# Patient Record
Sex: FEMALE | Race: WHITE | Employment: FULL TIME | URBAN - METROPOLITAN AREA
[De-identification: names, ages, dates, MRNs, and addresses within clinical notes are randomized per-mention and may not be internally consistent; named-entity substitution may affect disease eponyms.]

---

## 2018-02-15 ENCOUNTER — HOSPITAL ENCOUNTER (OUTPATIENT)
Dept: GENERAL RADIOLOGY | Age: 33
Discharge: HOME | End: 2018-02-17

## 2018-02-15 ENCOUNTER — HOSPITAL ENCOUNTER (OUTPATIENT)
Age: 33
Discharge: HOME | End: 2018-02-15

## 2018-02-15 DIAGNOSIS — Z00.00 PHYSICAL EXAM: ICD-10-CM

## 2018-02-15 PROCEDURE — 71046 X-RAY EXAM CHEST 2 VIEWS: CPT

## 2019-02-20 ENCOUNTER — HOSPITAL ENCOUNTER (OUTPATIENT)
Age: 34
Discharge: HOME | End: 2019-02-20

## 2019-02-20 ENCOUNTER — HOSPITAL ENCOUNTER (OUTPATIENT)
Dept: GENERAL RADIOLOGY | Age: 34
Discharge: HOME | End: 2019-02-22

## 2019-02-20 DIAGNOSIS — T14.90XA INJURY: ICD-10-CM

## 2019-02-20 PROCEDURE — 71046 X-RAY EXAM CHEST 2 VIEWS: CPT

## 2023-07-03 ENCOUNTER — HOSPITAL ENCOUNTER (EMERGENCY)
Age: 38
Discharge: HOME | End: 2023-07-03
Attending: EMERGENCY MEDICINE
Payer: COMMERCIAL

## 2023-07-03 VITALS
OXYGEN SATURATION: 100 % | HEART RATE: 91 BPM | SYSTOLIC BLOOD PRESSURE: 160 MMHG | RESPIRATION RATE: 17 BRPM | WEIGHT: 169.53 LBS | TEMPERATURE: 97.4 F | DIASTOLIC BLOOD PRESSURE: 109 MMHG

## 2023-07-03 DIAGNOSIS — S16.1XXA ACUTE STRAIN OF NECK MUSCLE, INITIAL ENCOUNTER: Primary | ICD-10-CM

## 2023-07-03 PROCEDURE — 99283 EMERGENCY DEPT VISIT LOW MDM: CPT

## 2023-07-03 PROCEDURE — 6370000000 HC RX 637 (ALT 250 FOR IP)

## 2023-07-03 RX ORDER — LIDOCAINE 4 G/G
1 PATCH TOPICAL DAILY
Qty: 10 EACH | Refills: 0 | Status: SHIPPED | OUTPATIENT
Start: 2023-07-03 | End: 2023-07-13

## 2023-07-03 RX ORDER — IBUPROFEN 200 MG
400 TABLET ORAL EVERY 8 HOURS PRN
Qty: 30 TABLET | Refills: 0 | Status: SHIPPED | OUTPATIENT
Start: 2023-07-03 | End: 2023-07-08

## 2023-07-03 RX ORDER — IBUPROFEN 400 MG/1
600 TABLET ORAL ONCE
Status: COMPLETED | OUTPATIENT
Start: 2023-07-03 | End: 2023-07-03

## 2023-07-03 RX ORDER — LIDOCAINE 4 G/G
1 PATCH TOPICAL DAILY
Status: DISCONTINUED | OUTPATIENT
Start: 2023-07-03 | End: 2023-07-03 | Stop reason: HOSPADM

## 2023-07-03 RX ADMIN — IBUPROFEN 600 MG: 400 TABLET, FILM COATED ORAL at 18:09

## 2023-07-03 ASSESSMENT — PAIN - FUNCTIONAL ASSESSMENT: PAIN_FUNCTIONAL_ASSESSMENT: 0-10

## 2023-07-03 ASSESSMENT — PAIN SCALES - GENERAL: PAINLEVEL_OUTOF10: 6

## 2023-07-03 NOTE — ED NOTES
Pt resting in bed completing workmans comp paperwork no verbalized needs at this time.       Liz Hammond, DEREK  07/03/23 2741

## 2023-07-03 NOTE — DISCHARGE INSTRUCTIONS
You were seen for evaluation of neck pain after a fall. This is likely due to a muscle spasm and strain. You will be discharged home with ibuprofen that he can take as needed for pain. You also be given lidocaine patches that you can apply to the area once per day. You should follow-up outpatient with your primary care doctor in the next 3 days for reevaluation of symptoms you should return the emergency department immediately for any worsening pain, numbness, tingling, dizziness, vision changes, loss of consciousness, other new or concerning symptoms.

## 2023-07-03 NOTE — ED PROVIDER NOTES
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 female with history of ADHD who presents with fall off of the fire truck that occurred 2 days ago. Patient works as a  and she was working at an event 2 days ago when she was standing on the back and she lost balance and fell about 3 feet off the back of the stationary fire truck. Patient states she landed on her right hip first and then fell to her side and hit her right side of her head. Patient did not lose consciousness. She is not anticoagulated. Patient states she was able to ambulate directly after the incident. She does not have associated nausea or vomiting. Patient is taking ibuprofen at home with some relief of pain. She now complains of pain in the right side of her neck that is worse with movement. No numbness or weakness. No dizziness, headaches, nausea or vomiting, chest pain, shortness of breath    PAST MEDICAL / SURGICAL / SOCIAL / FAMILY HISTORY      has a past medical history of ADHD (attention deficit hyperactivity disorder). has no past surgical history on file.       Social History     Socioeconomic History    Marital status: Single     Spouse name: Not on file    Number of children: Not on file    Years of education: Not on file    Highest education level: Not on file   Occupational History    Not on file   Tobacco Use    Smoking status: Never    Smokeless tobacco: Never   Substance and Sexual Activity

## 2023-07-03 NOTE — ED NOTES
Pt arrives to ED with reports of a fall. 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 Pt reports hitting head but denies a LOC with increase stiffening through the day, with troubles with sleep. Pt reports tasking ibuprofen for pain management, but still continues to experience pain, describing as stiff, aching, and sharper with turning head, rating pain 6/10.      Pt is hypertensive, otherwise VSS, NAD, RR equal and non labored, pt is alert and oriented x4,     Call light within reach    Following plan of care       Jessica Lomax RN  07/03/23 3648

## 2023-07-04 ASSESSMENT — ENCOUNTER SYMPTOMS: BACK PAIN: 0

## 2023-08-08 ENCOUNTER — HOSPITAL ENCOUNTER (OUTPATIENT)
Dept: PHYSICAL THERAPY | Age: 38
Setting detail: THERAPIES SERIES
Discharge: HOME | End: 2023-08-08
Payer: COMMERCIAL

## 2023-08-08 PROCEDURE — 97110 THERAPEUTIC EXERCISES: CPT

## 2023-08-08 PROCEDURE — 97161 PT EVAL LOW COMPLEX 20 MIN: CPT

## 2023-08-08 NOTE — CONSULTS
daily - 2 x weekly - 3 sets - 10 reps  - Shoulder Overhead Press in Abduction with Dumbbells  - 1 x daily - 2 x weekly - 3 sets - 10 reps    Pt. Education:  [x] Plans/Goals, Risks/Benefits discussed  [x] Home exercise program:   Method of Education: [x] Verbal  [x] Demo  [x] Written  Comprehension of Education:  [x] Verbalizes understanding. [x] Demonstrates understanding. [x] Needs Review. [] Demonstrates/verbalizes understanding of HEP/Ed previously given. Specific Instructions for next treatment: Cervical and shoulder strengthening, scapular training, postural education. Evaluation Complexity:  History (Personal factors, comorbidities) [] 0 [x] 1-2 [] 3+   Exam (limitations, restrictions) [] 1-2 [x] 3 [] 4+   Clinical presentation (progression) [x] Stable [] Evolving  [] Unstable   Decision Making [x] Low [] Moderate [] High    [x] Low Complexity [] Moderate Complexity [] High Complexity       Treatment Charges: Mins Time In/Out Units   [x] Evaluation       [x]  Low       []  Moderate       []  High 25 1278-1515 1   []  Modalities      [x]  Ther Exercise 15 5200-6056 1   []  Manual Therapy      []  Ther Activities      []  Aquatics      []  Vasocompression      []  Other        TOTAL TREATMENT TIME: 40      Time in:0730     Time out:0910    Electronically signed by: Eric Ambriz PT        Physician Signature:________________________________Date:__________________  By signing above or cosigning this note, I have reviewed this plan of care and certify a need for medically necessary rehabilitation services.      *PLEASE SIGN ABOVE AND FAX BACK ALL PAGES*

## 2023-08-10 ENCOUNTER — HOSPITAL ENCOUNTER (OUTPATIENT)
Dept: PHYSICAL THERAPY | Age: 38
Setting detail: THERAPIES SERIES
Discharge: HOME | End: 2023-08-10
Payer: COMMERCIAL

## 2023-08-10 PROCEDURE — 97140 MANUAL THERAPY 1/> REGIONS: CPT

## 2023-08-10 PROCEDURE — 97110 THERAPEUTIC EXERCISES: CPT

## 2023-08-10 NOTE — FLOWSHEET NOTE
given.     Plan: [x] Continue current frequency toward long and short term goals. [x] Specific Instructions for subsequent treatments: Add lateral and forward raises.        Time In:0740            Time Out: 200    Electronically signed by:  Praneeth Collier PT

## 2023-08-14 ENCOUNTER — HOSPITAL ENCOUNTER (OUTPATIENT)
Dept: PHYSICAL THERAPY | Age: 38
Setting detail: THERAPIES SERIES
Discharge: HOME | End: 2023-08-14
Payer: COMMERCIAL

## 2023-08-14 PROCEDURE — 97110 THERAPEUTIC EXERCISES: CPT

## 2023-08-14 NOTE — FLOWSHEET NOTE
[x] [address]  4600 [facility].  [phone]  F: [phone] [] 204 [facility]  [address]   [address]  P: [phone]  F: [phone] [] [address]  151 [facility]  P: [phone]  F: [phone] [] [place]: [phone]  F: [phone] [] 224 [facility]  [address] [facility]   [address]   P: [phone]  F: [phone]  [] 3338 [facility].   P: [phone]  F: [phone] [] 205 [facility]  [address] [place]  [address]  P: [phone]  F: [phone] [] 224 [facility]  [address] [facility]  [place]: [phone]  F: [phone] [] 1 [facility] [place] [facility] [address]  [place]: [phone]  F: [phone]      Physical Therapy Daily Treatment Note    Date:  2023  Patient Name:  Mariama Nye    :  1985  MRN: [id number]  Physician: Renetta Angulo MD         Insurance: [address] [place] [address] visits 23-23  Medical Diagnosis: S16. 1XXA Strain of Muscle, Fascia and tendon at neck level, initial encounter                     Rehab Codes: M54.2   Onset Date: 23                                 Next 's appt: this week   Visit# / total visits: 3/9    Cancels/No Shows: 1/0    Subjective:    Pain:  [x] Yes  [] No Location:Neck   Pain Rating: (0-10 scale) 0/10  Pain altered Tx:  [] No  [] Yes  Action:  Comments: No issues this AM or over the weekend. Patient needs to be out by 8:15 AM due to another appointment.

## 2023-08-16 ENCOUNTER — HOSPITAL ENCOUNTER (OUTPATIENT)
Dept: PHYSICAL THERAPY | Age: 38
Setting detail: THERAPIES SERIES
Discharge: HOME | End: 2023-08-16
Payer: COMMERCIAL

## 2023-08-16 PROCEDURE — 97110 THERAPEUTIC EXERCISES: CPT

## 2023-08-16 PROCEDURE — 97140 MANUAL THERAPY 1/> REGIONS: CPT

## 2023-08-16 NOTE — FLOWSHEET NOTE
Needs review. [] Demonstrates/verbalizes HEP/Ed previously given. Plan: [x] Continue current frequency toward long and short term goals.     [x] Specific Instructions for subsequent treatments: Increase dumbbell resistance       Time VQ:0508           Time Out: 0820    Electronically signed by:  Kashif Wall PT

## 2023-08-18 ENCOUNTER — HOSPITAL ENCOUNTER (OUTPATIENT)
Dept: PHYSICAL THERAPY | Age: 38
Setting detail: THERAPIES SERIES
Discharge: HOME | End: 2023-08-18
Payer: COMMERCIAL

## 2023-08-18 PROCEDURE — 97140 MANUAL THERAPY 1/> REGIONS: CPT

## 2023-08-18 PROCEDURE — 97110 THERAPEUTIC EXERCISES: CPT

## 2023-08-18 NOTE — PRE-CERTIFICATION NOTE
[x] [facility] - [facility] &  Therapy  [address] [facility].  [phone]  F: [phone] [] 204 [facility]  [address]   [address]  P: [phone]  F: [phone] [] [address]  [address]  P: [phone]  F: [phone] [] 224 [facility]  [address]   [place]: [phone]  F: [phone]  [] 1500 [facility]   Outpatient Occupational Therapy  [address] [facility].  P: [phone]  F: [phone]          Therapy Pre-certification Note      8/18/2023    Alexandro Polanco  1985   [id number]      Extension of end date was approved through 8/31/23. Faxed notes as requested from initial eval through 8/16/23 to [phone].       Electronically signed by Abbey Vallejo PT on 8/18/2023 at 9:11 AM

## 2023-08-18 NOTE — FLOWSHEET NOTE
[x] [address]  4600 [facility].  [phone]  F: [phone] [] 204 [facility]  [address]   [address]  P: [phone]  F: [phone] [] [address]  151 [facility]  P: [phone]  F: [phone] [] [place]: [phone]  F: [phone] [] 224 [facility]  [address] [facility]   [address]   P: [phone]  F: [phone]  [] [address] [facility].   P: [phone]  F: [phone] [] 205 [facility]  [address] [facility] [address]  P: [phone]  F: [phone] [] 224 [facility]  [address] [facility]  [place]: [phone]  F: [phone] [] 1 Medical [place] [facility] [address]  [place]: [phone]  F: [phone]      Physical Therapy Daily Treatment Note    Date:  2023  Patient Name:  Gia Bennett    :  1985  MRN: [id number]  Physician: Jax Isbell MD         Insurance: [address] [place] [address] visits 23-23  Medical Diagnosis: S16. 1XXA Strain of Muscle, Fascia and tendon at neck level, initial encounter                     Rehab Codes: M54.2   Onset Date: 23                                 Next 's appt: this week   Visit# / total visits:     Cancels/No Shows: 1/0    Subjective:    Pain:  [x] Yes  [] No Location:Neck   Pain Rating: (0-10 scale) 0/10  Pain altered Tx:  [] No  [] Yes  Action:  Comments: Patient arrives 12 minutes late to appointment. States left side of neck feels like she slept wrong last night.

## 2023-08-21 ENCOUNTER — HOSPITAL ENCOUNTER (OUTPATIENT)
Dept: PHYSICAL THERAPY | Age: 38
Setting detail: THERAPIES SERIES
Discharge: HOME | End: 2023-08-21
Payer: COMMERCIAL

## 2023-08-21 PROCEDURE — 97110 THERAPEUTIC EXERCISES: CPT

## 2023-08-21 NOTE — FLOWSHEET NOTE
[x] [address]  4600 [facility].  [phone]  F: [phone] [] 204 [facility]  [address]   [address]  P: [phone]  F: [phone] [] [address]  151 [facility]  P: [phone]  F: [phone] [] [place]: [phone]  F: [phone] [] 224 [facility]  One [facility]   [address]   P: [phone]  F: [phone]  [] [address] [facility].   P: [phone]  F: [phone] [] 205 [facility]  [address] [place]  [address]  P: [phone]  F: [phone] [] 224 [facility]  [address] [facility]  [place]: [phone]  F: [phone] [] 1 [facility] [address]  [place]: [phone]  F: [phone]      Physical Therapy Daily Treatment Note    Date:  2023  Patient Name:  Bouchra Escamilla    :  1985  MRN: [id number]  Physician: Cortez Toney MD         Insurance: [address] [place] [address] visits 23-23 extended to 23  Medical Diagnosis: S16. 1XXA Strain of Muscle, Fascia and tendon at neck level, initial encounter                     Rehab Codes: M54.2   Onset Date: 23                                 Next 's appt: this week   Visit# / total visits:     Cancels/No Shows: 1/0    Subjective:    Pain:  [x] Yes  [] No Location:Neck   Pain Rating: (0-10 scale) 0/10  Pain altered Tx:  [] No  [] Yes  Action:  Comments: Patient is doing well no significant neck pain.     Objective:  Modalities:

## 2023-08-23 ENCOUNTER — HOSPITAL ENCOUNTER (OUTPATIENT)
Dept: PHYSICAL THERAPY | Age: 38
Setting detail: THERAPIES SERIES
Discharge: HOME | End: 2023-08-23
Payer: COMMERCIAL

## 2023-08-23 PROCEDURE — 97140 MANUAL THERAPY 1/> REGIONS: CPT

## 2023-08-23 PROCEDURE — 97110 THERAPEUTIC EXERCISES: CPT

## 2023-08-23 NOTE — FLOWSHEET NOTE
Time Weight/ Level Comments   UBE   Held time--late arrival         Standing       B ER   2x15x  plum Used blue (old Theraband)    B Habd    2x15x plum Used blue (old Theraband)   Face pulls  2x15x Plum     Shrugs    2x15x  15 lb     Shld Flexion 2x15x 5 lb    Shld abduction  2x15x 5 lb     Corner stretch    After all weighted exercise    Looped band flexion   lime          Cybex Cables       Rows 2x15x 5 pl     Seated Lat pull downs 2x15x 5 pl     Lat Extensions 2x15x 3 pl           Prone       Ys, Ts, Ext  2x10x 5 lb          Incline bench      Shoulder press 2x15x 15 lb/ L3          Seated       Cervical ROM  10x ea     Cervical Retraction  10x                 Other:  Manual Therapy: Hypervolt to B upper trap to reduce muscle tension and ease pain. x10 min    Treatment Charges: Mins Time in/out Units   []  Modalities      [x]  Ther Exercise 40 6283-081 2   [x]  Manual Therapy 10 8560-9396 1   []  Ther Activities      []  Neuro Re-ed      []  Vasocompression      [] Gait      []  Other      Total Treatment time 50  3       Assessment: [x] Progressing toward goals. Patient continues to demonstrate good recall of exercises and good technique. Upper trapezius strength is near normal. Plan to D/C after final 2 sessions. [] No change. [] Other:  [x] Patient will benefit form physical therapy to restore pain free cervical ROM, and restore lifting, and carrying ability to return to full duty work. LTG: (to be met in 9 treatments)  ? Pain:0/10 neck pain with ADLs. ? ROM: Equal cervical rotation and side bending to improve head turn to increase visual field. ? Strength:Patient is able to lift and carry 40 lb box without neck pain to simulate work activity. ? Function:NDI score of 6% impaired or better to RTW. Independent with Home Exercise Programs.     Pt. Education:  [x] Yes  [] No  [x] Reviewed Prior HEP/Ed  Method of Education: [x] Verbal  [x] Demo  [x] Written  Comprehension of Education:  [x]

## 2023-08-25 ENCOUNTER — APPOINTMENT (OUTPATIENT)
Dept: PHYSICAL THERAPY | Age: 38
End: 2023-08-25
Payer: COMMERCIAL

## 2023-08-28 ENCOUNTER — HOSPITAL ENCOUNTER (OUTPATIENT)
Dept: PHYSICAL THERAPY | Age: 38
Setting detail: THERAPIES SERIES
Discharge: HOME | End: 2023-08-28
Payer: COMMERCIAL

## 2023-08-28 PROCEDURE — 97110 THERAPEUTIC EXERCISES: CPT

## 2023-08-28 PROCEDURE — 97140 MANUAL THERAPY 1/> REGIONS: CPT

## 2023-08-28 NOTE — FLOWSHEET NOTE
Precautions:  Exercises:  Exercise Reps/ Time Weight/ Level Comments   UBE 2/2 min  L3          Standing       B ER   2x15x  plum Used blue (old Theraband)    B Habd    2x15x plum Used blue (old Theraband)   Face pulls  2x15x Plum     Shrugs    2x15x  15 lb     Shld Flexion 2x15x 6 lb    Shld abduction  2x15x 6 lb     Corner stretch  held  After all weighted exercise    Looped band flexion   lime          Cybex Cables       Rows 2x15x 5 pl     Seated Lat pull downs 2x15x 5 pl     Lat Extensions 2x15x 3 pl           Prone       Ys, Ts, Ext  2x10x 6 lb          Incline bench      Shoulder press 2x15x 15 lb/ L3          Seated       Cervical ROM  10x ea     Cervical Retraction  10x                 Other:  Manual Therapy: Hypervolt to B upper trap to reduce muscle tension and ease pain. x10 min    Treatment Charges: Mins Time in/out Units   []  Modalities      [x]  Ther Exercise 30 9040-810 2   [x]  Manual Therapy 10 2266-8233 1   []  Ther Activities      []  Neuro Re-ed      []  Vasocompression      [] Gait      []  Other      Total Treatment time 40  3       Assessment: [x] Progressing toward goals. Patient demonstrates good tolerance to increased resistance demonstrating improved shoulder and cervical strength. [] No change. [] Other:  [x] Patient will benefit from physical therapy to restore pain free cervical ROM, and restore lifting, and carrying ability to return to full duty work. LTG: (to be met in 9 treatments)  ? Pain:0/10 neck pain with ADLs. ? ROM: Equal cervical rotation and side bending to improve head turn to increase visual field. ? Strength:Patient is able to lift and carry 40 lb box without neck pain to simulate work activity. ? Function:NDI score of 6% impaired or better to RTW. Independent with Home Exercise Programs.     Pt. Education:  [x] Yes  [] No  [x] Reviewed Prior HEP/Ed  Method of Education: [x] Verbal  [x] Demo  [x] Written  Comprehension of Education:  [x]

## 2023-08-30 ENCOUNTER — HOSPITAL ENCOUNTER (OUTPATIENT)
Dept: PHYSICAL THERAPY | Age: 38
Setting detail: THERAPIES SERIES
Discharge: HOME | End: 2023-08-30
Payer: COMMERCIAL

## 2023-08-30 PROCEDURE — 97110 THERAPEUTIC EXERCISES: CPT

## 2023-08-30 NOTE — DISCHARGE SUMMARY
[x] [address]  4600 [facility].  [phone]  F: [phone] [] 204 [facility]  [address]   [address]  P: [phone]  F: [phone] [] [address]  151 [facility]  P: [phone]  F: [phone] [] [place]: [phone]  F: [phone] [] 224 [facility]   [address]   P: [phone]  F: [phone]  [] 7[address] [facility].   P: [phone]  F: [phone] [] 205 [facility]  [address] [place]  [address]  P: [phone]  F: [phone] [] [facility]  [address] [place] [address]  [place]: [phone]  F: [phone] [] [address]  [place]: [phone]  F: [phone]      Physical Therapy Discharge Note    Date: 2023      Patient: Anita Salguero  : 1985  MRN: [id number]    Physician: Yi Ugalde MD         Insurance: [address] [facility]. Mgmt. 9 visits 23-23 extended to 23  Medical Diagnosis: S16. 1XXA Strain of Muscle, Fascia and tendon at neck level, initial encounter                     Rehab Codes: M54.2   Onset Date: 23                                 Next 's appt: this week   Visit# / total visits:                         Cancels/No Shows: 10  Date of initial visit: 23                Date of final visit: 23    Subjective:    Pain:  [x] Yes  [] No   Location: Neck            Pain Rating: (0-10 scale) 0/10  Pain altered Tx:  [] No  [x] Yes  Action:  Comments: Patient

## 2023-08-30 NOTE — FLOWSHEET NOTE
[x] [address]  4600 [facility].  [phone]  F: [phone] [] 204 [facility]  [address]   [address]  P: [phone]  F: [phone] [] [address]  151 [facility]  P: [phone]  F: [phone] [] [facility] [place]: [phone]  F: [phone] [] 224 [facility]  [address] [facility]   [address]   P: [phone]  F: [phone]  [] 7[address] [facility].   P: [phone]  F: [phone] [] 205 [facility]  [address] [place]  [address]  P: [phone]  F: [phone] [] 224 [facility]  [address] [facility]  [place]: [phone]  F: [phone] [] [facility] [address]  [place]: [phone]  F: [phone]      Physical Therapy Daily Treatment Note    Date:  2023  Patient Name:  Jose Barrera    :  1985  MRN: [id number]  Physician: Eric Almeida MD         Insurance: [address] [place] [address] visits 23-23 extended to 23  Medical Diagnosis: S16. 1XXA Strain of Muscle, Fascia and tendon at neck level, initial encounter                     Rehab Codes: M54.2   Onset Date: 23                                 Next 's appt: this week   Visit# / total visits:     Cancels/No Shows: 1/0    Subjective:    Pain:  [x] Yes  [] No Location: Neck   Pain Rating: (0-10 scale) 0/10  Pain altered Tx:  [] No  [x] Yes  Action:  Comments: Patient with no complaints feels ready to return to full duty work.      Objective:  Modalities: